# Patient Record
Sex: MALE | Race: WHITE | NOT HISPANIC OR LATINO | Employment: OTHER | URBAN - METROPOLITAN AREA
[De-identification: names, ages, dates, MRNs, and addresses within clinical notes are randomized per-mention and may not be internally consistent; named-entity substitution may affect disease eponyms.]

---

## 2023-04-03 PROBLEM — N42.89: Status: ACTIVE | Noted: 2023-04-03

## 2023-04-03 PROBLEM — K57.90 DIVERTICULOSIS: Status: ACTIVE | Noted: 2023-04-03

## 2023-04-03 PROBLEM — R79.89 ELEVATED LFTS: Status: ACTIVE | Noted: 2023-04-03

## 2023-04-03 PROBLEM — M10.9 GOUT: Status: ACTIVE | Noted: 2023-04-03

## 2023-04-03 PROBLEM — E78.5 HLD (HYPERLIPIDEMIA): Status: ACTIVE | Noted: 2023-04-03

## 2023-04-03 PROBLEM — I10 HYPERTENSION: Status: ACTIVE | Noted: 2023-04-03

## 2023-04-03 PROBLEM — R31.9 HEMATURIA: Status: ACTIVE | Noted: 2023-04-03

## 2023-04-03 PROBLEM — E55.9 VITAMIN D DEFICIENCY: Status: ACTIVE | Noted: 2023-04-03

## 2023-04-03 PROBLEM — R79.89 ELEVATED TSH: Status: ACTIVE | Noted: 2023-04-03

## 2023-04-03 PROBLEM — E66.9 OBESITY: Status: ACTIVE | Noted: 2023-04-03

## 2023-04-03 PROBLEM — R53.83 FATIGUE: Status: ACTIVE | Noted: 2023-04-03

## 2023-04-03 PROBLEM — E11.9 DIABETES MELLITUS, TYPE 2 (MULTI): Status: ACTIVE | Noted: 2023-04-03

## 2023-04-03 RX ORDER — METFORMIN HYDROCHLORIDE 500 MG/1
1 TABLET ORAL
COMMUNITY
Start: 2017-06-09 | End: 2023-06-05

## 2023-04-03 RX ORDER — LANCETS 33 GAUGE
EACH MISCELLANEOUS
COMMUNITY

## 2023-04-03 RX ORDER — LISINOPRIL 20 MG/1
1 TABLET ORAL DAILY
COMMUNITY
Start: 2014-09-03 | End: 2023-07-06

## 2023-04-03 RX ORDER — ALLOPURINOL 300 MG/1
1 TABLET ORAL DAILY
COMMUNITY
Start: 2020-08-26 | End: 2023-05-09

## 2023-04-03 RX ORDER — BISOPROLOL FUMARATE AND HYDROCHLOROTHIAZIDE 10; 6.25 MG/1; MG/1
1 TABLET ORAL DAILY
COMMUNITY
Start: 2017-03-12 | End: 2023-06-05

## 2023-04-03 RX ORDER — BLOOD SUGAR DIAGNOSTIC
STRIP MISCELLANEOUS
COMMUNITY
Start: 2020-09-11 | End: 2023-07-31

## 2023-04-04 ENCOUNTER — OFFICE VISIT (OUTPATIENT)
Dept: PRIMARY CARE | Facility: CLINIC | Age: 73
End: 2023-04-04
Payer: MEDICARE

## 2023-04-04 VITALS
HEART RATE: 74 BPM | HEIGHT: 72 IN | BODY MASS INDEX: 31.07 KG/M2 | SYSTOLIC BLOOD PRESSURE: 138 MMHG | WEIGHT: 229.4 LBS | DIASTOLIC BLOOD PRESSURE: 75 MMHG | TEMPERATURE: 98.3 F

## 2023-04-04 DIAGNOSIS — E11.9 TYPE 2 DIABETES MELLITUS WITHOUT COMPLICATION, WITHOUT LONG-TERM CURRENT USE OF INSULIN (MULTI): ICD-10-CM

## 2023-04-04 DIAGNOSIS — Z12.5 PROSTATE CANCER SCREENING: ICD-10-CM

## 2023-04-04 DIAGNOSIS — I10 HYPERTENSION, UNSPECIFIED TYPE: ICD-10-CM

## 2023-04-04 DIAGNOSIS — E55.9 VITAMIN D DEFICIENCY: ICD-10-CM

## 2023-04-04 DIAGNOSIS — R79.89 ELEVATED TSH: ICD-10-CM

## 2023-04-04 DIAGNOSIS — M10.9 GOUT, UNSPECIFIED CAUSE, UNSPECIFIED CHRONICITY, UNSPECIFIED SITE: ICD-10-CM

## 2023-04-04 DIAGNOSIS — Z00.00 MEDICARE ANNUAL WELLNESS VISIT, SUBSEQUENT: Primary | ICD-10-CM

## 2023-04-04 DIAGNOSIS — E78.5 HYPERLIPIDEMIA, UNSPECIFIED HYPERLIPIDEMIA TYPE: ICD-10-CM

## 2023-04-04 DIAGNOSIS — R53.83 OTHER FATIGUE: ICD-10-CM

## 2023-04-04 PROBLEM — M19.90 ARTHRITIS: Status: RESOLVED | Noted: 2023-04-04 | Resolved: 2023-04-04

## 2023-04-04 LAB
NON-UH HIE A/G RATIO: 1.8
NON-UH HIE ALB: 4.6 G/DL (ref 3.4–5)
NON-UH HIE ALK PHOS: 45 UNIT/L (ref 46–116)
NON-UH HIE BILIRUBIN, TOTAL: 1 MG/DL (ref 0.2–1)
NON-UH HIE BUN/CREAT RATIO: 16.4
NON-UH HIE BUN: 18 MG/DL (ref 9–23)
NON-UH HIE CALCIUM: 10 MG/DL (ref 8.7–10.4)
NON-UH HIE CALCULATED LDL CHOLESTEROL: 111 MG/DL (ref 60–130)
NON-UH HIE CALCULATED OSMOLALITY: 283 MOSM/KG (ref 275–295)
NON-UH HIE CHLORIDE: 104 MMOL/L (ref 98–107)
NON-UH HIE CHOLESTEROL: 202 MG/DL (ref 100–200)
NON-UH HIE CO2, VENOUS: 28 MMOL/L (ref 20–31)
NON-UH HIE CREATININE: 1.1 MG/DL (ref 0.6–1.1)
NON-UH HIE FREE T4: 0.93 NG/DL (ref 0.89–1.76)
NON-UH HIE GFR AA: >60
NON-UH HIE GLOBULIN: 2.5 G/DL
NON-UH HIE GLOMERULAR FILTRATION RATE: >60 ML/MIN/1.73M?
NON-UH HIE GLUCOSE: 121 MG/DL (ref 74–106)
NON-UH HIE GOT: 40 UNIT/L (ref 15–37)
NON-UH HIE GPT: 60 UNIT/L (ref 10–49)
NON-UH HIE HCT: 45.6 % (ref 41–52)
NON-UH HIE HDL CHOLESTEROL: 31 MG/DL (ref 40–60)
NON-UH HIE HGB A1C: 5.5 %
NON-UH HIE HGB: 15.8 G/DL (ref 13.5–17.5)
NON-UH HIE INSTR WBC ND: 6.2
NON-UH HIE K: 4.4 MMOL/L (ref 3.5–5.1)
NON-UH HIE MCH: 34.9 PG (ref 27–34)
NON-UH HIE MCHC: 34.5 G/DL (ref 32–37)
NON-UH HIE MCV: 101 FL (ref 80–100)
NON-UH HIE MPV: 8.5 FL (ref 7.4–10.4)
NON-UH HIE NA: 140 MMOL/L (ref 135–145)
NON-UH HIE PLATELET: 174 X10 (ref 150–450)
NON-UH HIE RBC: 4.52 X10 (ref 4.7–6.1)
NON-UH HIE RDW: 12.7 % (ref 11.5–14.5)
NON-UH HIE TOTAL CHOL/HDL CHOL RATIO: 6.5
NON-UH HIE TOTAL PROTEIN: 7.1 G/DL (ref 5.7–8.2)
NON-UH HIE TRIGLYCERIDES: 302 MG/DL (ref 30–150)
NON-UH HIE TSH: 6.25 UIU/ML (ref 0.55–4.78)
NON-UH HIE URIC ACID: 5.8 (ref 3.7–9.2)
NON-UH HIE VIT D 25: 23 NG/ML
NON-UH HIE WBC: 6.2 X10 (ref 4.5–11)

## 2023-04-04 PROCEDURE — 4010F ACE/ARB THERAPY RXD/TAKEN: CPT | Performed by: FAMILY MEDICINE

## 2023-04-04 PROCEDURE — 1036F TOBACCO NON-USER: CPT | Performed by: FAMILY MEDICINE

## 2023-04-04 PROCEDURE — 1170F FXNL STATUS ASSESSED: CPT | Performed by: FAMILY MEDICINE

## 2023-04-04 PROCEDURE — 1159F MED LIST DOCD IN RCRD: CPT | Performed by: FAMILY MEDICINE

## 2023-04-04 PROCEDURE — G0439 PPPS, SUBSEQ VISIT: HCPCS | Performed by: FAMILY MEDICINE

## 2023-04-04 PROCEDURE — 3078F DIAST BP <80 MM HG: CPT | Performed by: FAMILY MEDICINE

## 2023-04-04 PROCEDURE — 3075F SYST BP GE 130 - 139MM HG: CPT | Performed by: FAMILY MEDICINE

## 2023-04-04 PROCEDURE — 1160F RVW MEDS BY RX/DR IN RCRD: CPT | Performed by: FAMILY MEDICINE

## 2023-04-04 ASSESSMENT — ENCOUNTER SYMPTOMS
NAUSEA: 0
CONSTIPATION: 0
VOMITING: 0
UNEXPECTED WEIGHT CHANGE: 0
CHEST TIGHTNESS: 0
DIARRHEA: 0
SHORTNESS OF BREATH: 0
PALPITATIONS: 0
ACTIVITY CHANGE: 0
ABDOMINAL PAIN: 0
BLOOD IN STOOL: 0
APPETITE CHANGE: 0

## 2023-04-04 ASSESSMENT — ACTIVITIES OF DAILY LIVING (ADL)
GROCERY_SHOPPING: INDEPENDENT
BATHING: INDEPENDENT
TAKING_MEDICATION: INDEPENDENT
DRESSING: INDEPENDENT
DOING_HOUSEWORK: INDEPENDENT
MANAGING_FINANCES: INDEPENDENT

## 2023-04-04 ASSESSMENT — PATIENT HEALTH QUESTIONNAIRE - PHQ9
2. FEELING DOWN, DEPRESSED OR HOPELESS: NOT AT ALL
SUM OF ALL RESPONSES TO PHQ9 QUESTIONS 1 AND 2: 0
1. LITTLE INTEREST OR PLEASURE IN DOING THINGS: NOT AT ALL

## 2023-04-04 NOTE — PROGRESS NOTES
Subjective   Reason for Visit: Alberto Tinsley is an 72 y.o. male here for a Medicare Wellness visit.     Past Medical, Surgical, and Family History reviewed and updated in chart.    Reviewed all medications by prescribing practitioner or clinical pharmacist (such as prescriptions, OTCs, herbal therapies and supplements) and documented in the medical record.    HPI  72 years old male presents for f/u and medicare wellness visit     hx HTN-on lisinopril 20mg and bisoprolol/HCTZ 10/6.25;  has not been checking BP at home recently    hx HLD/DM2/obesity BMI 31   Tries to watch diet;  walks 3/4 mile on treadmill every day  checks his sugars- avgs- 130s.    hx of tob=quit around 2000  no abd pain  prior CT showed aortic calcification  pt defers checking screening US for AAA     hx of elevated tsh but normal free T4 in past.   4/2018 thyroid US showed:  Slight enlargement of both lobes.  Inhomogeneous thyroid parenchyma is a nonspecific ultrasonographic  finding but consistent with chronic medical thyroid disease.  no neck changes.    hx gout- no recent flare   takes allopurinol    hx of kidney stones in past;   had renal US in may 2018- showed an incidental mass in prostate- referred to urology- saw dr grubbs- was told nothing to worry about per pt;   saw him 5/2020 virtually- he was supposed to check a 24 hr urine and f/u in 6 mo but never did  5/2022 PSA wnl  Had gross hematuria/kidney stone 2022- saw urologist     hx of elevated LFTs in past; never had liver US as ordered in past no RUQ abd pain;l    had +cologuard 2019- had f/u colonoscopy 6/19 and 12/19+polyps; f/u 2 yrs; no bowel changes. no blood in stool.    GI volweiler- has scheduled for  4/26/23    shingrix-defers  flu shot-had  pneumovax-2015  prevnar   covid shots- had    denies any chest pains, palpitations, shortness of breath, abdominal pains, reflux, nausea, vomiting, diarrhea, constipation, blood in stool, melena.       wears glasses; due to see  optho. No vision changes  due sees dentist for regular cleanings;    no mole changes.      no sores on feet  no paresthesias    lives in [facility] with his girlfriend most of the time  comes back to stay with daughter here    flu shot-had  prevnar 13 2016  Pneumovax 2015  recommended shingrix - defers at this time.   had covid shots        /75   Pulse 74   Temp 36.8 °C (98.3 °F)   Ht 1.829 m (6')   Wt 104 kg (229 lb 6.4 oz)   BMI 31.11 kg/m²     Lab Results   Component Value Date    WBC 8.4 05/10/2022    HGB 15.9 05/10/2022    HCT 47.1 05/10/2022     (H) 05/10/2022     05/10/2022       Chemistry    Lab Results   Component Value Date/Time     05/10/2022 0949    K 4.6 05/10/2022 0949     05/10/2022 0949    CO2 27 05/10/2022 0949    BUN 22 05/10/2022 0949    CREATININE 1.14 05/10/2022 0949    Lab Results   Component Value Date/Time    CALCIUM 9.8 05/10/2022 0949    ALKPHOS 44 05/10/2022 0949    AST 26 05/10/2022 0949    ALT 36 05/10/2022 0949    BILITOT 0.9 05/10/2022 0949           Lab Results   Component Value Date    CHOL 204 (H) 05/10/2022    CHOL 180 04/27/2021    CHOL 199 04/04/2019     Lab Results   Component Value Date    HDL 30.8 (A) 05/10/2022    HDL 28.1 (A) 04/27/2021    HDL 33.0 (A) 04/04/2019     No results found for: LDLCALC  Lab Results   Component Value Date    TRIG 308 (H) 05/10/2022    TRIG 350 (H) 04/27/2021    TRIG 191 (H) 04/04/2019     No components found for: CHOLHDL    Patient Self Assessment of Health Status  Patient Self Assessment: Good    Nutrition and Exercise  Current Diet: Well Balanced Diet  Adequate Fluid Intake: Yes  Caffeine: Yes  Exercise Frequency: Infrequently    Functional Ability/Level of Safety  Cognitive Impairment Observed: No cognitive impairment observed    Home Safety Risk Factors: None    Patient Care Team:  Wendy C Malinda, DO as PCP - General  Wendy Petty, DO as PCP - [place] Medicare [employer]vantage PCP     Review of Systems    Constitutional:  Negative for activity change, appetite change and unexpected weight change.   Respiratory:  Negative for chest tightness and shortness of breath.    Cardiovascular:  Negative for chest pain, palpitations and leg swelling.   Gastrointestinal:  Negative for abdominal pain, blood in stool, constipation, diarrhea, nausea and vomiting.       Medicare Wellness Billing Compliance Satisfied    Objective     Vitals:  /75   Pulse 74   Temp 36.8 °C (98.3 °F)   Ht 1.829 m (6')   Wt 104 kg (229 lb 6.4 oz)   BMI 31.11 kg/m²       Physical Exam  Vitals and nursing note reviewed.   Constitutional:       Appearance: Normal appearance. He is normal weight.   HENT:      Head: Normocephalic and atraumatic.      Right Ear: Tympanic membrane, ear canal and external ear normal.      Left Ear: Tympanic membrane, ear canal and external ear normal.      Nose: Nose normal.      Mouth/Throat:      Mouth: Mucous membranes are moist.      Pharynx: Oropharynx is clear.   Eyes:      Extraocular Movements: Extraocular movements intact.      Conjunctiva/sclera: Conjunctivae normal.      Pupils: Pupils are equal, round, and reactive to light.   Cardiovascular:      Rate and Rhythm: Normal rate and regular rhythm.   Pulmonary:      Effort: Pulmonary effort is normal.      Breath sounds: Normal breath sounds.   Abdominal:      General: Abdomen is flat. Bowel sounds are normal.      Palpations: Abdomen is soft.   Musculoskeletal:         General: Normal range of motion.      Cervical back: Neck supple.   Skin:     General: Skin is warm and dry.   Neurological:      General: No focal deficit present.      Mental Status: He is alert and oriented to person, place, and time.   Psychiatric:         Mood and Affect: Mood normal.         Behavior: Behavior normal.         Thought Content: Thought content normal.         Judgment: Judgment normal.         Assessment/Plan   Problem List Items Addressed This Visit           Circulatory    Hypertension    Relevant Orders    Comprehensive Metabolic Panel       Endocrine/Metabolic    Diabetes mellitus, type 2 (CMS/HCC)    Relevant Orders    Comprehensive Metabolic Panel    Hemoglobin A1C    Vitamin D deficiency    Relevant Orders    Vitamin D, Total       Other    Elevated TSH    Relevant Orders    TSH with reflex to Free T4 if abnormal    Fatigue    Relevant Orders    CBC    TSH with reflex to Free T4 if abnormal    Gout - Primary    Relevant Orders    Uric acid    HLD (hyperlipidemia)    Relevant Orders    Lipid Panel    Comprehensive Metabolic Panel     Other Visit Diagnoses       Prostate cancer screening        Relevant Orders    Prostate Specific Antigen, Screen        check fasting labs  5/2022 urine MA+; on lisinopril; will recheck at next visit  cont to monitor BP  12/2019 colonosocpy +polyps f/u 2 yrs- f/u with GI- was due 12/2021; has upcoming appt  healthy lifestyle-diet and exercise, wt loss.  annual optho   Pt defers checking screening AAA US   Check PSA in may

## 2023-04-05 ENCOUNTER — TELEPHONE (OUTPATIENT)
Dept: PRIMARY CARE | Facility: CLINIC | Age: 73
End: 2023-04-05
Payer: MEDICARE

## 2023-04-05 NOTE — TELEPHONE ENCOUNTER
----- Message from Wendy Petty DO sent at 4/5/2023  1:50 PM EDT -----  Noted; thanks  ----- Message -----  From: Raquel Casarez CMA  Sent: 4/5/2023  12:50 PM EDT  To: Wendy Petty DO    Informed patient. He will call back to schedule the follow up appt.   ----- Message -----  From: Wendy Petty DO  Sent: 4/5/2023   9:40 AM EDT  To: Raquel Casarez CMA    Please let pt know that his uric acid level, blood counts, electrolytes, and kidney function are all normal.   His liver enzymes are again slightly elevated.  His vitamin D level is low at 23 (should be above 30).  I recommend he  take at least 2000 units of an OTC vitamin D supplement daily and we will cont to monitor.  His screening thyroid test was again slightly out of range but the actual level of circulating thyroid hormone was normal.    His sugar was slightly elevated at 121 but his HbA1c, the 3 month average of his  sugars was good at 5.5.   His triglycerides were quite elevated at 302(should be less than 150).  I recommend a healthy diet and exercise and have him follow up in a few mo to see how he is doing and recheck labs.

## 2023-05-09 DIAGNOSIS — M10.9 GOUT, UNSPECIFIED: ICD-10-CM

## 2023-05-09 RX ORDER — ALLOPURINOL 300 MG/1
TABLET ORAL
Qty: 90 TABLET | Refills: 1 | Status: SHIPPED | OUTPATIENT
Start: 2023-05-09 | End: 2023-11-06

## 2023-06-05 DIAGNOSIS — E11.9 TYPE 2 DIABETES MELLITUS WITHOUT COMPLICATION, WITHOUT LONG-TERM CURRENT USE OF INSULIN (MULTI): ICD-10-CM

## 2023-06-05 DIAGNOSIS — I10 ESSENTIAL (PRIMARY) HYPERTENSION: ICD-10-CM

## 2023-06-05 RX ORDER — METFORMIN HYDROCHLORIDE 500 MG/1
TABLET ORAL
Qty: 180 TABLET | Refills: 1 | Status: SHIPPED | OUTPATIENT
Start: 2023-06-05 | End: 2023-09-11

## 2023-06-05 RX ORDER — BISOPROLOL FUMARATE AND HYDROCHLOROTHIAZIDE 10; 6.25 MG/1; MG/1
TABLET ORAL
Qty: 90 TABLET | Refills: 1 | Status: SHIPPED | OUTPATIENT
Start: 2023-06-05 | End: 2023-09-11

## 2023-07-01 LAB — HEMOGLOBIN A1C/HEMOGLOBIN TOTAL IN BLOOD EXTERNAL: 5.5 %

## 2023-07-06 DIAGNOSIS — I10 ESSENTIAL (PRIMARY) HYPERTENSION: ICD-10-CM

## 2023-07-06 RX ORDER — LISINOPRIL 20 MG/1
TABLET ORAL
Qty: 90 TABLET | Refills: 1 | Status: SHIPPED | OUTPATIENT
Start: 2023-07-06 | End: 2024-01-02

## 2023-07-29 DIAGNOSIS — E11.9 TYPE 2 DIABETES MELLITUS WITHOUT COMPLICATIONS (MULTI): ICD-10-CM

## 2023-07-31 RX ORDER — BLOOD SUGAR DIAGNOSTIC
STRIP MISCELLANEOUS
Qty: 100 STRIP | Refills: 1 | Status: SHIPPED | OUTPATIENT
Start: 2023-07-31 | End: 2024-02-12

## 2023-09-09 DIAGNOSIS — I10 ESSENTIAL (PRIMARY) HYPERTENSION: ICD-10-CM

## 2023-09-09 DIAGNOSIS — E11.9 TYPE 2 DIABETES MELLITUS WITHOUT COMPLICATION, WITHOUT LONG-TERM CURRENT USE OF INSULIN (MULTI): ICD-10-CM

## 2023-09-11 RX ORDER — METFORMIN HYDROCHLORIDE 500 MG/1
500 TABLET ORAL
Qty: 180 TABLET | Refills: 1 | Status: SHIPPED | OUTPATIENT
Start: 2023-09-11 | End: 2024-02-29

## 2023-09-11 RX ORDER — BISOPROLOL FUMARATE AND HYDROCHLOROTHIAZIDE 10; 6.25 MG/1; MG/1
TABLET ORAL
Qty: 90 TABLET | Refills: 1 | Status: SHIPPED | OUTPATIENT
Start: 2023-09-11 | End: 2024-02-29

## 2023-11-05 DIAGNOSIS — M10.9 GOUT, UNSPECIFIED: ICD-10-CM

## 2023-11-06 RX ORDER — ALLOPURINOL 300 MG/1
TABLET ORAL
Qty: 90 TABLET | Refills: 1 | Status: SHIPPED | OUTPATIENT
Start: 2023-11-06 | End: 2024-04-30

## 2023-12-31 DIAGNOSIS — I10 ESSENTIAL (PRIMARY) HYPERTENSION: ICD-10-CM

## 2024-01-02 RX ORDER — LISINOPRIL 20 MG/1
TABLET ORAL
Qty: 90 TABLET | Refills: 1 | Status: SHIPPED | OUTPATIENT
Start: 2024-01-02

## 2024-02-12 DIAGNOSIS — E11.9 TYPE 2 DIABETES MELLITUS WITHOUT COMPLICATIONS (MULTI): ICD-10-CM

## 2024-02-12 RX ORDER — BLOOD SUGAR DIAGNOSTIC
STRIP MISCELLANEOUS
Qty: 100 STRIP | Refills: 1 | Status: SHIPPED | OUTPATIENT
Start: 2024-02-12

## 2024-02-19 ENCOUNTER — TELEPHONE (OUTPATIENT)
Dept: PRIMARY CARE | Facility: CLINIC | Age: 74
End: 2024-02-19
Payer: MEDICARE

## 2024-02-29 DIAGNOSIS — E11.9 TYPE 2 DIABETES MELLITUS WITHOUT COMPLICATION, WITHOUT LONG-TERM CURRENT USE OF INSULIN (MULTI): ICD-10-CM

## 2024-02-29 DIAGNOSIS — I10 ESSENTIAL (PRIMARY) HYPERTENSION: ICD-10-CM

## 2024-02-29 RX ORDER — METFORMIN HYDROCHLORIDE 500 MG/1
500 TABLET ORAL
Qty: 180 TABLET | Refills: 1 | Status: SHIPPED | OUTPATIENT
Start: 2024-02-29

## 2024-02-29 RX ORDER — BISOPROLOL FUMARATE AND HYDROCHLOROTHIAZIDE 10; 6.25 MG/1; MG/1
TABLET ORAL
Qty: 90 TABLET | Refills: 1 | Status: SHIPPED | OUTPATIENT
Start: 2024-02-29

## 2024-04-22 NOTE — PROGRESS NOTES
Subjective   Reason for Visit: Alberto Tinsley is an 73 y.o. male here for a Medicare Wellness visit.     Past Medical, Surgical, and Family History reviewed and updated in chart.    Reviewed all medications by prescribing practitioner or clinical pharmacist (such as prescriptions, OTCs, herbal therapies and supplements) and documented in the medical record.    HPI  73 years old male presents for f/u and medicare wellness visit     Last labs 4/2023 HbA1c 5.5   Vit D was low    hx HTN-on lisinopril 20mg and bisoprolol/HCTZ 10/6.25;  has not been checking BP at home recently but has a cuff    hx HLD/DM2/obesity BMI 30   Tries to watch diet;    Got a total gym  checks his sugars- a little higher recently with vacation    Hx  of tob=quit around 2000  no abd pain  prior CT showed aortic calcification  pt defers checking screening US for AAA     hx of elevated tsh but normal free T4 in past.   4/2018 thyroid US showed:  Slight enlargement of both lobes.  Inhomogeneous thyroid parenchyma is a nonspecific ultrasonographic  finding but consistent with chronic medical thyroid disease.  no neck changes.    hx gout- no recent flare   takes allopurinol    hx of kidney stones in past;   had renal US in may 2018- showed an incidental mass in prostate- referred to urology- saw dr grubbs- was told nothing to worry about per pt; fu this yr  saw him 5/2020 virtually- he was supposed to check a 24 hr urine and f/u in 6 mo but never did  5/2022 PSA wnl?   Had gross hematuria/kidney stone 2022- saw urologist;  never fu; no recent issues  No urinary changes.  Hx of UTI last summer;     hx of elevated LFTs in past; never had liver US as ordered in past no RUQ abd pain;    had +cologuard 2019- had f/u colonoscopy 6/19 and 12/19+polyps; f/u 2 yrs; no bowel changes. no blood in stool.    GI volweiler- had done 4/26/23  fu 3 yrs;      shingrix-defers  flu shot-had  pneumovax-2015  prevnar   covid shots- had  RSV-not yet    denies any  chest pains, palpitations, shortness of breath, abdominal pains, reflux, nausea, vomiting, diarrhea, constipation, blood in stool, melena.       wears glasses;  sees optho.   due sees dentist for regular cleanings;  had a tooth pulled bc it cracked    no mole changes.      no sores on feet  no paresthesias    lives in [facility] with his girlfriend most of the time  comes back to stay with daughter here          /81   Pulse 74   Temp 36.8 °C (98.3 °F)   Ht 1.829 m (6')   Wt 103 kg (227 lb 3.2 oz)   BMI 30.81 kg/m²     Patient Self Assessment of Health Status  Patient Self Assessment: Good    Nutrition and Exercise  Current Diet: Well Balanced Diet  Adequate Fluid Intake: Yes  Caffeine: Yes  Exercise Frequency: Infrequently         Home Safety Risk Factors: None    Patient Care Team:  Wendy Petty DO as PCP - General  Wendy Petty DO as PCP - [employer]vantage PCP     Review of Systems  ROS as stated in HPI    Medicare Wellness Visit Billing Compliance Not Met    *This is a visual tool to show completion of required items on the day of the visit. Green checks will only appear on the date of visit.    Objective     Vitals:  /81   Pulse 74   Temp 36.8 °C (98.3 °F)   Ht 1.829 m (6')   Wt 103 kg (227 lb 3.2 oz)   BMI 30.81 kg/m²       Physical Exam  Constitutional: A&O; NAD  HEENT: conjunctiva normal. EOMI; PERRLA; lids normal; TM's clear;   Neck: supple; No lymphadenopathy   Heart: RRR     Lungs: CTA bilateral   Abd: Soft, Nontender, Nondistended  Ext:  No edema;    Neuro: No gross neuro deficits     Psych: Judgment, orientation, mood, affect, insight wnl   Assessment/Plan   Problem List Items Addressed This Visit       Diabetes mellitus, type 2 (Multi)     Other Visit Diagnoses       Medicare annual wellness visit, subsequent    -  Primary        flu shot-had  prevnar 13 2016  Pneumovax 2015  recommended shingrix - defers at this time.   RSV- defers  had covid shots  check  fasting labs/PSA  5/2022 urine MA+; on lisinopril; recheck -neg  Check UA-neg; fu with urologist  cont to monitor BP  Pt prefers to monitor BP at home- Recommend increasing lisinopril from 20 to 30mg if consistently elevated  4/2023  colonosocpy +polyps f/u 3 yrs per pt GI vollweiler  healthy lifestyle-diet and exercise, wt loss.  annual optho   Pt defers checking screening AAA US

## 2024-04-25 ENCOUNTER — OFFICE VISIT (OUTPATIENT)
Dept: PRIMARY CARE | Facility: CLINIC | Age: 74
End: 2024-04-25
Payer: MEDICARE

## 2024-04-25 VITALS
WEIGHT: 227.2 LBS | BODY MASS INDEX: 30.77 KG/M2 | TEMPERATURE: 98.3 F | DIASTOLIC BLOOD PRESSURE: 78 MMHG | SYSTOLIC BLOOD PRESSURE: 136 MMHG | HEIGHT: 72 IN | HEART RATE: 74 BPM

## 2024-04-25 DIAGNOSIS — R53.83 OTHER FATIGUE: ICD-10-CM

## 2024-04-25 DIAGNOSIS — E55.9 VITAMIN D DEFICIENCY: ICD-10-CM

## 2024-04-25 DIAGNOSIS — E11.9 TYPE 2 DIABETES MELLITUS WITHOUT COMPLICATION, WITHOUT LONG-TERM CURRENT USE OF INSULIN (MULTI): ICD-10-CM

## 2024-04-25 DIAGNOSIS — R79.89 ELEVATED TSH: ICD-10-CM

## 2024-04-25 DIAGNOSIS — R31.9 HEMATURIA, UNSPECIFIED TYPE: ICD-10-CM

## 2024-04-25 DIAGNOSIS — M10.9 GOUT, UNSPECIFIED CAUSE, UNSPECIFIED CHRONICITY, UNSPECIFIED SITE: ICD-10-CM

## 2024-04-25 DIAGNOSIS — Z00.00 HEALTHCARE MAINTENANCE: ICD-10-CM

## 2024-04-25 DIAGNOSIS — I10 HYPERTENSION, UNSPECIFIED TYPE: ICD-10-CM

## 2024-04-25 DIAGNOSIS — Z00.00 MEDICARE ANNUAL WELLNESS VISIT, SUBSEQUENT: Primary | ICD-10-CM

## 2024-04-25 DIAGNOSIS — E78.5 HYPERLIPIDEMIA, UNSPECIFIED HYPERLIPIDEMIA TYPE: ICD-10-CM

## 2024-04-25 DIAGNOSIS — Z12.5 PROSTATE CANCER SCREENING: ICD-10-CM

## 2024-04-25 LAB
NON-UH HIE A/G RATIO: 1.5
NON-UH HIE ALB: 4.6 G/DL (ref 3.4–5)
NON-UH HIE ALK PHOS: 49 UNIT/L (ref 45–117)
NON-UH HIE BILIRUBIN, TOTAL: 1.1 MG/DL (ref 0.3–1.2)
NON-UH HIE BUN/CREAT RATIO: 15.5
NON-UH HIE BUN: 17 MG/DL (ref 9–23)
NON-UH HIE CALCIUM: 10.2 MG/DL (ref 8.7–10.4)
NON-UH HIE CALCULATED LDL CHOLESTEROL: 124 MG/DL (ref 60–130)
NON-UH HIE CALCULATED OSMOLALITY: 283 MOSM/KG (ref 275–295)
NON-UH HIE CHLORIDE: 103 MMOL/L (ref 98–107)
NON-UH HIE CHOLESTEROL: 221 MG/DL (ref 100–200)
NON-UH HIE CO2, VENOUS: 28 MMOL/L (ref 20–31)
NON-UH HIE CREATININE: 1.1 MG/DL (ref 0.6–1.1)
NON-UH HIE FREE T4: 1.05 NG/DL (ref 0.89–1.76)
NON-UH HIE GFR AA: >60
NON-UH HIE GLOBULIN: 3 G/DL
NON-UH HIE GLOMERULAR FILTRATION RATE: >60 ML/MIN/1.73M?
NON-UH HIE GLUCOSE: 127 MG/DL (ref 74–106)
NON-UH HIE GOT: 70 UNIT/L (ref 15–37)
NON-UH HIE GPT: 106 UNIT/L (ref 10–49)
NON-UH HIE HCT: 45.6 % (ref 41–52)
NON-UH HIE HDL CHOLESTEROL: 36 MG/DL (ref 40–60)
NON-UH HIE HGB A1C: 5.6 %
NON-UH HIE HGB: 16 G/DL (ref 13.5–17.5)
NON-UH HIE INSTR WBC ND: 6.3
NON-UH HIE K: 4.4 MMOL/L (ref 3.5–5.1)
NON-UH HIE MCH: 35.6 PG (ref 27–34)
NON-UH HIE MCHC: 35 G/DL (ref 32–37)
NON-UH HIE MCV: 101.7 FL (ref 80–100)
NON-UH HIE MPV: 8.2 FL (ref 7.4–10.4)
NON-UH HIE NA: 140 MMOL/L (ref 135–145)
NON-UH HIE PLATELET: 193 X10 (ref 150–450)
NON-UH HIE PROSTATIC SPECIFIC AG SCREEN: 0.4 NG/ML (ref 0–4)
NON-UH HIE RBC: 4.49 X10 (ref 4.7–6.1)
NON-UH HIE RDW: 12.9 % (ref 11.5–14.5)
NON-UH HIE TOTAL CHOL/HDL CHOL RATIO: 6.1
NON-UH HIE TOTAL PROTEIN: 7.6 G/DL (ref 5.7–8.2)
NON-UH HIE TRIGLYCERIDES: 303 MG/DL (ref 30–150)
NON-UH HIE TSH: 7.11 UIU/ML (ref 0.55–4.78)
NON-UH HIE URIC ACID: 5.6 MG/DL (ref 3.7–9.2)
NON-UH HIE VIT D 25: 28 NG/ML
NON-UH HIE WBC: 6.3 X10 (ref 4.5–11)
POC ALBUMIN /CREATININE RATIO MANUALLY ENTERED: <30 UG/MG CREAT
POC APPEARANCE, URINE: CLEAR
POC BILIRUBIN, URINE: NEGATIVE
POC BLOOD, URINE: NEGATIVE
POC COLOR, URINE: ABNORMAL
POC GLUCOSE, URINE: NEGATIVE MG/DL
POC KETONES, URINE: NEGATIVE MG/DL
POC LEUKOCYTES, URINE: NEGATIVE
POC NITRITE,URINE: NEGATIVE
POC PH, URINE: 6 PH
POC PROTEIN, URINE: ABNORMAL MG/DL
POC SPECIFIC GRAVITY, URINE: 1.02
POC URINE ALBUMIN: 30 MG/L
POC URINE CREATININE: 100 MG/DL
POC UROBILINOGEN, URINE: 0.2 EU/DL

## 2024-04-25 PROCEDURE — 1170F FXNL STATUS ASSESSED: CPT | Performed by: FAMILY MEDICINE

## 2024-04-25 PROCEDURE — 82044 UR ALBUMIN SEMIQUANTITATIVE: CPT | Performed by: FAMILY MEDICINE

## 2024-04-25 PROCEDURE — 4010F ACE/ARB THERAPY RXD/TAKEN: CPT | Performed by: FAMILY MEDICINE

## 2024-04-25 PROCEDURE — 3075F SYST BP GE 130 - 139MM HG: CPT | Performed by: FAMILY MEDICINE

## 2024-04-25 PROCEDURE — G0439 PPPS, SUBSEQ VISIT: HCPCS | Performed by: FAMILY MEDICINE

## 2024-04-25 PROCEDURE — 1124F ACP DISCUSS-NO DSCNMKR DOCD: CPT | Performed by: FAMILY MEDICINE

## 2024-04-25 PROCEDURE — 81003 URINALYSIS AUTO W/O SCOPE: CPT | Performed by: FAMILY MEDICINE

## 2024-04-25 PROCEDURE — 1036F TOBACCO NON-USER: CPT | Performed by: FAMILY MEDICINE

## 2024-04-25 PROCEDURE — 3078F DIAST BP <80 MM HG: CPT | Performed by: FAMILY MEDICINE

## 2024-04-25 PROCEDURE — 1159F MED LIST DOCD IN RCRD: CPT | Performed by: FAMILY MEDICINE

## 2024-04-25 PROCEDURE — 1160F RVW MEDS BY RX/DR IN RCRD: CPT | Performed by: FAMILY MEDICINE

## 2024-04-25 ASSESSMENT — PATIENT HEALTH QUESTIONNAIRE - PHQ9
10. IF YOU CHECKED OFF ANY PROBLEMS, HOW DIFFICULT HAVE THESE PROBLEMS MADE IT FOR YOU TO DO YOUR WORK, TAKE CARE OF THINGS AT HOME, OR GET ALONG WITH OTHER PEOPLE: NOT DIFFICULT AT ALL
2. FEELING DOWN, DEPRESSED OR HOPELESS: NOT AT ALL
SUM OF ALL RESPONSES TO PHQ9 QUESTIONS 1 AND 2: 0
1. LITTLE INTEREST OR PLEASURE IN DOING THINGS: NOT AT ALL

## 2024-04-25 ASSESSMENT — ACTIVITIES OF DAILY LIVING (ADL)
DRESSING: INDEPENDENT
DOING_HOUSEWORK: INDEPENDENT
BATHING: INDEPENDENT
GROCERY_SHOPPING: INDEPENDENT
MANAGING_FINANCES: INDEPENDENT
TAKING_MEDICATION: INDEPENDENT

## 2024-04-26 ENCOUNTER — TELEPHONE (OUTPATIENT)
Dept: PRIMARY CARE | Facility: CLINIC | Age: 74
End: 2024-04-26
Payer: MEDICARE

## 2024-04-26 LAB — HEMOGLOBIN A1C/HEMOGLOBIN TOTAL IN BLOOD EXTERNAL: 5.6 %

## 2024-04-26 ASSESSMENT — PATIENT HEALTH QUESTIONNAIRE - PHQ9
1. LITTLE INTEREST OR PLEASURE IN DOING THINGS: NOT AT ALL
SUM OF ALL RESPONSES TO PHQ9 QUESTIONS 1 AND 2: 0
2. FEELING DOWN, DEPRESSED OR HOPELESS: NOT AT ALL

## 2024-04-26 ASSESSMENT — ACTIVITIES OF DAILY LIVING (ADL)
GROCERY_SHOPPING: INDEPENDENT
TAKING_MEDICATION: INDEPENDENT
DOING_HOUSEWORK: INDEPENDENT
BATHING: INDEPENDENT
MANAGING_FINANCES: INDEPENDENT
DRESSING: INDEPENDENT

## 2024-04-26 NOTE — TELEPHONE ENCOUNTER
----- Message from Wendy Petty DO sent at 4/26/2024  8:58 AM EDT -----  Please let pt know that his PSA, uric acid, blood counts, electrolytes, and kidney function are all normal.   His lipids were quite elevated.  notify him of all the lipid #s. His  sugar was elevated at 127 but hs HbA1c, the 3 mo avg of sugars was good at 5.6. (ideally for good diabetic control, we would like to keep this less than 7.    His screening thyroid test was slightly out of range but the actual level of circulating thyroid hormone was normal.    His vitamin D level is low at 28(should be above 30).  I recommend they  take at least 2000 units of an OTC vitamin D supplement daily   His liver enzymes were again elevated.  I recommend he avoid any tylenol or alcohol and check an US of his liver  Enter order for biliary US dx elevated LFTs  Have him fu in 3 mo to recheck labs and see how he is doing.

## 2024-04-26 NOTE — TELEPHONE ENCOUNTER
Informed patient.  He would like to hold off on the ultrasound and figure out if he wants to do here or in [place].  He will be back in town in Sept and will call back to make a follow up when he is in town.

## 2024-04-30 DIAGNOSIS — M10.9 GOUT, UNSPECIFIED: ICD-10-CM

## 2024-04-30 RX ORDER — ALLOPURINOL 300 MG/1
TABLET ORAL
Qty: 90 TABLET | Refills: 1 | Status: SHIPPED | OUTPATIENT
Start: 2024-04-30

## 2024-05-22 ENCOUNTER — TELEPHONE (OUTPATIENT)
Dept: PRIMARY CARE | Facility: CLINIC | Age: 74
End: 2024-05-22
Payer: MEDICARE

## 2024-05-22 NOTE — TELEPHONE ENCOUNTER
Pt is in [place] and wanted a referral of Ultrasound of liver to be sent to [place]. Can be faxed to [phone]

## 2024-05-23 DIAGNOSIS — R79.89 ELEVATED LFTS: ICD-10-CM

## 2024-06-03 ENCOUNTER — HOSPITAL ENCOUNTER (OUTPATIENT)
Dept: RADIOLOGY | Facility: HOSPITAL | Age: 74
Discharge: HOME | End: 2024-06-03
Payer: MEDICARE

## 2024-06-03 DIAGNOSIS — R79.89 ELEVATED LFTS: ICD-10-CM

## 2024-06-03 PROCEDURE — 76705 ECHO EXAM OF ABDOMEN: CPT | Performed by: RADIOLOGY

## 2024-06-03 PROCEDURE — 76705 ECHO EXAM OF ABDOMEN: CPT

## 2024-06-11 ENCOUNTER — TELEPHONE (OUTPATIENT)
Dept: PRIMARY CARE | Facility: CLINIC | Age: 74
End: 2024-06-11
Payer: MEDICARE

## 2024-06-11 NOTE — TELEPHONE ENCOUNTER
----- Message from Wendy Petty DO sent at 6/4/2024  4:02 PM EDT -----  Please let patient know his ultrasound showed that his liver was enlarged and there was diffuse fatty infiltration.  I recommend he follow-up with his GI Dr Vollweiler or a liver specialist for further evaluation.  Enter referral for GI Dx  hepatomegaly, fatty liver, elevated LFTs

## 2024-06-24 DIAGNOSIS — I10 ESSENTIAL (PRIMARY) HYPERTENSION: ICD-10-CM

## 2024-06-24 RX ORDER — LISINOPRIL 20 MG/1
TABLET ORAL
Qty: 90 TABLET | Refills: 1 | Status: SHIPPED | OUTPATIENT
Start: 2024-06-24

## 2024-08-05 DIAGNOSIS — E11.9 TYPE 2 DIABETES MELLITUS WITHOUT COMPLICATIONS (MULTI): ICD-10-CM

## 2024-08-05 RX ORDER — BLOOD SUGAR DIAGNOSTIC
STRIP MISCELLANEOUS
Qty: 100 STRIP | Refills: 1 | Status: SHIPPED | OUTPATIENT
Start: 2024-08-05

## 2024-08-22 DIAGNOSIS — I10 ESSENTIAL (PRIMARY) HYPERTENSION: ICD-10-CM

## 2024-08-22 DIAGNOSIS — E11.9 TYPE 2 DIABETES MELLITUS WITHOUT COMPLICATION, WITHOUT LONG-TERM CURRENT USE OF INSULIN (MULTI): ICD-10-CM

## 2024-08-22 RX ORDER — METFORMIN HYDROCHLORIDE 500 MG/1
500 TABLET ORAL
Qty: 180 TABLET | Refills: 1 | Status: SHIPPED | OUTPATIENT
Start: 2024-08-22

## 2024-08-22 RX ORDER — BISOPROLOL FUMARATE AND HYDROCHLOROTHIAZIDE 10; 6.25 MG/1; MG/1
TABLET ORAL
Qty: 90 TABLET | Refills: 1 | Status: SHIPPED | OUTPATIENT
Start: 2024-08-22

## 2024-10-25 DIAGNOSIS — M10.9 GOUT, UNSPECIFIED: ICD-10-CM

## 2024-10-25 RX ORDER — ALLOPURINOL 300 MG/1
TABLET ORAL
Qty: 90 TABLET | Refills: 1 | Status: SHIPPED | OUTPATIENT
Start: 2024-10-25

## 2024-12-17 DIAGNOSIS — I10 ESSENTIAL (PRIMARY) HYPERTENSION: ICD-10-CM

## 2024-12-17 RX ORDER — LISINOPRIL 20 MG/1
TABLET ORAL
Qty: 90 TABLET | Refills: 1 | Status: SHIPPED | OUTPATIENT
Start: 2024-12-17

## 2025-01-20 ENCOUNTER — APPOINTMENT (OUTPATIENT)
Dept: PRIMARY CARE | Facility: CLINIC | Age: 75
End: 2025-01-20
Payer: MEDICARE

## 2025-01-20 VITALS
DIASTOLIC BLOOD PRESSURE: 72 MMHG | WEIGHT: 220.8 LBS | OXYGEN SATURATION: 97 % | TEMPERATURE: 97.4 F | HEART RATE: 70 BPM | RESPIRATION RATE: 18 BRPM | HEIGHT: 72 IN | SYSTOLIC BLOOD PRESSURE: 138 MMHG | BODY MASS INDEX: 29.91 KG/M2

## 2025-01-20 DIAGNOSIS — E78.5 HYPERLIPIDEMIA, UNSPECIFIED HYPERLIPIDEMIA TYPE: ICD-10-CM

## 2025-01-20 DIAGNOSIS — I10 HYPERTENSION, UNSPECIFIED TYPE: ICD-10-CM

## 2025-01-20 DIAGNOSIS — E55.9 VITAMIN D DEFICIENCY: ICD-10-CM

## 2025-01-20 DIAGNOSIS — M10.9 GOUT, UNSPECIFIED CAUSE, UNSPECIFIED CHRONICITY, UNSPECIFIED SITE: ICD-10-CM

## 2025-01-20 DIAGNOSIS — R79.89 ELEVATED LFTS: ICD-10-CM

## 2025-01-20 DIAGNOSIS — R53.83 OTHER FATIGUE: ICD-10-CM

## 2025-01-20 DIAGNOSIS — E11.9 TYPE 2 DIABETES MELLITUS WITHOUT COMPLICATION, WITHOUT LONG-TERM CURRENT USE OF INSULIN (MULTI): ICD-10-CM

## 2025-01-20 DIAGNOSIS — R35.0 URINE FREQUENCY: ICD-10-CM

## 2025-01-20 DIAGNOSIS — R79.89 ELEVATED TSH: ICD-10-CM

## 2025-01-20 DIAGNOSIS — E11.9 TYPE 2 DIABETES MELLITUS WITHOUT COMPLICATION, WITHOUT LONG-TERM CURRENT USE OF INSULIN (MULTI): Primary | ICD-10-CM

## 2025-01-20 DIAGNOSIS — I10 HYPERTENSION, UNSPECIFIED TYPE: Primary | ICD-10-CM

## 2025-01-20 LAB
HEMOGLOBIN A1C/HEMOGLOBIN TOTAL IN BLOOD EXTERNAL: 5.8 %
NON-UH HIE A/G RATIO: 1.5
NON-UH HIE ALB: 4.9 G/DL (ref 3.4–5)
NON-UH HIE ALK PHOS: 52 UNIT/L (ref 45–117)
NON-UH HIE BILIRUBIN, TOTAL: 1.2 MG/DL (ref 0.3–1.2)
NON-UH HIE BUN/CREAT RATIO: 19.2
NON-UH HIE BUN: 25 MG/DL (ref 9–23)
NON-UH HIE CALCIUM: 10.4 MG/DL (ref 8.7–10.4)
NON-UH HIE CALCULATED LDL CHOLESTEROL: 120 MG/DL (ref 60–130)
NON-UH HIE CALCULATED OSMOLALITY: 280 MOSM/KG (ref 275–295)
NON-UH HIE CHLORIDE: 101 MMOL/L (ref 98–107)
NON-UH HIE CHOLESTEROL: 196 MG/DL (ref 100–200)
NON-UH HIE CO2, VENOUS: 28 MMOL/L (ref 20–31)
NON-UH HIE CREATININE: 1.3 MG/DL (ref 0.6–1.1)
NON-UH HIE FREE T4: 1.3 NG/DL (ref 0.89–1.76)
NON-UH HIE GFR AA: >60
NON-UH HIE GLOBULIN: 3.2 G/DL
NON-UH HIE GLOMERULAR FILTRATION RATE: 54 ML/MIN/1.73M?
NON-UH HIE GLUCOSE: 129 MG/DL (ref 74–106)
NON-UH HIE GOT: 47 UNIT/L (ref 15–37)
NON-UH HIE GPT: 67 UNIT/L (ref 10–49)
NON-UH HIE HCT: 47.2 % (ref 41–52)
NON-UH HIE HDL CHOLESTEROL: 36 MG/DL (ref 40–60)
NON-UH HIE HGB A1C: 5.8 %
NON-UH HIE HGB: 16.3 G/DL (ref 13.5–17.5)
NON-UH HIE INSTR WBC ND: 8.1
NON-UH HIE K: 4.3 MMOL/L (ref 3.5–5.1)
NON-UH HIE MCH: 34.7 PG (ref 27–34)
NON-UH HIE MCHC: 34.5 G/DL (ref 32–37)
NON-UH HIE MCV: 100.4 FL (ref 80–100)
NON-UH HIE MPV: 8.7 FL (ref 7.4–10.4)
NON-UH HIE NA: 137 MMOL/L (ref 135–145)
NON-UH HIE PLATELET: 191 X10 (ref 150–450)
NON-UH HIE RBC: 4.7 X10 (ref 4.7–6.1)
NON-UH HIE RDW: 12.9 % (ref 11.5–14.5)
NON-UH HIE TOTAL CHOL/HDL CHOL RATIO: 5.4
NON-UH HIE TOTAL PROTEIN: 8.1 G/DL (ref 5.7–8.2)
NON-UH HIE TRIGLYCERIDES: 202 MG/DL (ref 30–150)
NON-UH HIE TSH: 4.92 UIU/ML (ref 0.55–4.78)
NON-UH HIE URIC ACID: 6.5 MG/DL (ref 3.7–9.2)
NON-UH HIE WBC: 8.1 X10 (ref 4.5–11)
POC APPEARANCE, URINE: CLEAR
POC BILIRUBIN, URINE: NEGATIVE
POC BLOOD, URINE: NEGATIVE
POC COLOR, URINE: YELLOW
POC GLUCOSE, URINE: NEGATIVE MG/DL
POC KETONES, URINE: NEGATIVE MG/DL
POC LEUKOCYTES, URINE: ABNORMAL
POC NITRITE,URINE: NEGATIVE
POC PH, URINE: 5.5 PH
POC PROTEIN, URINE: NEGATIVE MG/DL
POC SPECIFIC GRAVITY, URINE: 1.02
POC UROBILINOGEN, URINE: 0.2 EU/DL

## 2025-01-20 PROCEDURE — 99214 OFFICE O/P EST MOD 30 MIN: CPT | Performed by: FAMILY MEDICINE

## 2025-01-20 PROCEDURE — 3078F DIAST BP <80 MM HG: CPT | Performed by: FAMILY MEDICINE

## 2025-01-20 PROCEDURE — 1159F MED LIST DOCD IN RCRD: CPT | Performed by: FAMILY MEDICINE

## 2025-01-20 PROCEDURE — 87086 URINE CULTURE/COLONY COUNT: CPT

## 2025-01-20 PROCEDURE — 3075F SYST BP GE 130 - 139MM HG: CPT | Performed by: FAMILY MEDICINE

## 2025-01-20 PROCEDURE — 1036F TOBACCO NON-USER: CPT | Performed by: FAMILY MEDICINE

## 2025-01-20 PROCEDURE — 4010F ACE/ARB THERAPY RXD/TAKEN: CPT | Performed by: FAMILY MEDICINE

## 2025-01-20 PROCEDURE — 1160F RVW MEDS BY RX/DR IN RCRD: CPT | Performed by: FAMILY MEDICINE

## 2025-01-20 PROCEDURE — 3008F BODY MASS INDEX DOCD: CPT | Performed by: FAMILY MEDICINE

## 2025-01-20 PROCEDURE — 81003 URINALYSIS AUTO W/O SCOPE: CPT | Performed by: FAMILY MEDICINE

## 2025-01-21 LAB — BACTERIA UR CULT: NORMAL

## 2025-01-22 ENCOUNTER — TELEPHONE (OUTPATIENT)
Dept: PRIMARY CARE | Facility: CLINIC | Age: 75
End: 2025-01-22
Payer: MEDICARE

## 2025-01-22 NOTE — TELEPHONE ENCOUNTER
----- Message from Wendy Petty sent at 1/21/2025 12:53 PM EST -----  Please let pt know that his uric acid level, blood counts, and electrolytes were normal.   His triglycerides were slightly elevated and his HDL or good cholesterol was low.  Notify them of all the lipid #s.    His sugar was elevated at 129 but his HbA1c, the 3 mo avg of his sugars was still good at 5.8.  Ideally for good diabetic control, we would like to keep this less than 6.5.  I recommend a healthy diet and exercise and we will continue to monitor.    His screening thyroid test was again slightly out of range but the actual level of circulating thyroid hormone was normal.  His kidney function was slightly elevated at 1.3(should be less than 1.1) and his liver enzymes were again slightly elevated.  We will continue to monitor.

## 2025-01-22 NOTE — TELEPHONE ENCOUNTER
----- Message from Wendy Petty sent at 1/22/2025 12:06 PM EST -----  Please let pt know their urine culture was negative for an infection.

## 2025-02-14 DIAGNOSIS — E11.9 TYPE 2 DIABETES MELLITUS WITHOUT COMPLICATION, WITHOUT LONG-TERM CURRENT USE OF INSULIN (MULTI): ICD-10-CM

## 2025-02-14 DIAGNOSIS — I10 ESSENTIAL (PRIMARY) HYPERTENSION: ICD-10-CM

## 2025-02-14 RX ORDER — BISOPROLOL FUMARATE AND HYDROCHLOROTHIAZIDE 10; 6.25 MG/1; MG/1
TABLET ORAL
Qty: 90 TABLET | Refills: 1 | Status: SHIPPED | OUTPATIENT
Start: 2025-02-14

## 2025-02-14 RX ORDER — METFORMIN HYDROCHLORIDE 500 MG/1
500 TABLET ORAL
Qty: 180 TABLET | Refills: 1 | Status: SHIPPED | OUTPATIENT
Start: 2025-02-14

## 2025-04-03 ENCOUNTER — APPOINTMENT (OUTPATIENT)
Dept: PRIMARY CARE | Facility: CLINIC | Age: 75
End: 2025-04-03
Payer: MEDICARE

## 2025-04-23 DIAGNOSIS — M10.9 GOUT, UNSPECIFIED: ICD-10-CM

## 2025-04-23 RX ORDER — ALLOPURINOL 300 MG/1
300 TABLET ORAL DAILY
Qty: 90 TABLET | Refills: 1 | Status: SHIPPED | OUTPATIENT
Start: 2025-04-23

## 2025-05-04 NOTE — PROGRESS NOTES
Subjective   Reason for Visit: Alberto Tinsley is an 74 y.o. male here for a Medicare Wellness visit.     Past Medical, Surgical, and Family History reviewed and updated in chart.    Reviewed all medications by prescribing practitioner or clinical pharmacist (such as prescriptions, OTCs, herbal therapies and supplements) and documented in the medical record.    HPI  74 years old male presents for MWV    hx HTN-on lisinopril 20mg and bisoprolol/HCTZ 10/6.25;  has not been checking BP at home     hx HLD/DM2/obesity BMI 31 wt is up 10lbs since jan- recent trip to [place]  last hba1c in jan was 5.8    Hx  of tob=quit around 2000  no abd pain  prior CT showed aortic calcification  pt defers checking screening US for AAA     hx of elevated tsh but normal free T4 in past.   4/2018 thyroid US showed:  Slight enlargement of both lobes.  Inhomogeneous thyroid parenchyma is a nonspecific ultrasonographic  finding but consistent with chronic medical thyroid disease.  no neck changes.    hx gout- no recent flare   takes allopurinol    hx of kidney stones in past;   had renal US in may 2018- showed an incidental mass in prostate- referred to urology- saw dr grubbs- was told nothing to worry about per pt;  saw him 5/2020 virtually- he was supposed to check a 24 hr urine and f/u in 6 mo but never did  Had gross hematuria/kidney stone 2022- saw urologist;  never fu; no recent issues  Was admitted for UTI in [place] in oct 2024-  Currently no symptoms   Denies any dysuria, gross hematuria, fevers or chills or flank pain.  4/2024 PSA wnl    hx of elevated LFTs    had liver US 6/2024- enlarged liver; fatty liver- saw GI and had additional labs and fibrocan and was told ok per GI; +etoh    had +cologuard 2019- had f/u colonoscopy 6/19 and 12/19+polyps; f/u 2 yrs; no bowel changes. no blood in stool.    GI volweiler- had done 4/26/23  fu 3 yrs;      shingrix-defers  flu shot-had  pneumovax-2015  prevnar   covid shots- had  RSV-not yet-  defers    denies any chest pains, palpitations, shortness of breath, abdominal pains, reflux, nausea, vomiting, diarrhea, constipation, blood in stool, melena.     wears glasses;  sees optho.   sees dentist prn    lives in [facility] with his girlfriend most of the time  comes back to stay with daughter here    /88   Pulse 73   Temp 37.1 °C (98.8 °F)   Ht 1.829 m (6')   Wt 104 kg (230 lb 3.2 oz)   BMI 31.22 kg/m²     Patient Self Assessment of Health Status  Patient Self Assessment: Good    Nutrition and Exercise  Current Diet: Well Balanced Diet  Adequate Fluid Intake: Yes  Caffeine: Yes  Exercise Frequency: Infrequently    Functional Ability/Level of Safety  Cognitive Impairment Observed: No cognitive impairment observed    Home Safety Risk Factors: None    Patient Care Team:  Wendy Petty DO as PCP - General  Wendy Petty DO as PCP - [employer]vantage PCP  Jason F Vollweiler, MD as Referring Physician (Gastroenterology)     Review of Systems  ROS as stated in HPI  Medicare Wellness Billing Compliance Satisfied    *This is a visual tool to show completion of required items on the day of the visit. Green checks will only appear on the date of visit.    Objective     Vitals:  /88   Pulse 73   Temp 37.1 °C (98.8 °F)   Ht 1.829 m (6')   Wt 104 kg (230 lb 3.2 oz)   BMI 31.22 kg/m²       Physical Exam  Constitutional: A&O; NAD  HEENT: conjunctiva normal. EOMI; PERRLA; lids normal; TM's clear;   Neck: supple; No lymphadenopathy   Heart: RRR     Lungs: CTA bilateral   Abd: Soft, Nontender, Nondistended  Ext:  No edema;    Neuro: No gross neuro deficits    Psych: Judgment, orientation, mood, affect, insight wnl     Assessment/Plan   Problem List Items Addressed This Visit       Diabetes mellitus, type 2 (Multi)    Relevant Orders    Albumin-Creatinine Ratio, Urine Random    Comprehensive Metabolic Panel    Hemoglobin A1C    Elevated LFTs    Relevant Orders    Comprehensive Metabolic  Panel    Elevated TSH    Relevant Orders    TSH with reflex to Free T4 if abnormal    Fatigue    Relevant Orders    CBC    TSH with reflex to Free T4 if abnormal    Gout    HLD (hyperlipidemia)    Relevant Orders    Comprehensive Metabolic Panel    Lipid Panel    Hypertension    Relevant Orders    Comprehensive Metabolic Panel    Vitamin D deficiency    Relevant Orders    Vitamin D 25-Hydroxy,Total (for eval of Vitamin D levels)     Other Visit Diagnoses         Medicare annual wellness visit, subsequent    -  Primary      Prostate cancer screening        Relevant Orders    Prostate Spec.Ag,Screen        Reviewed GI note from 8/2024- get fibroscan report  Check labs /psa  flu shot-had  prevnar 13 2016  Pneumovax 2015  Prevnar 20 2024  recommended RSV and shingrix - defers at this time.   had covid shots  4/2024 PSA  4/2024 urine MA neg; on lisinopril; unable to give urine sample today  cont to monitor BP  4/2023  colonosocpy +polyps f/u 3 yrs per pt GI vollweiler  healthy lifestyle-diet and exercise, wt loss.  annual optho   Fu 6 mo or sooner if needed

## 2025-05-05 ENCOUNTER — APPOINTMENT (OUTPATIENT)
Dept: PRIMARY CARE | Facility: CLINIC | Age: 75
End: 2025-05-05
Payer: MEDICARE

## 2025-05-05 VITALS
TEMPERATURE: 98.8 F | SYSTOLIC BLOOD PRESSURE: 161 MMHG | DIASTOLIC BLOOD PRESSURE: 88 MMHG | BODY MASS INDEX: 31.18 KG/M2 | HEART RATE: 73 BPM | HEIGHT: 72 IN | WEIGHT: 230.2 LBS

## 2025-05-05 DIAGNOSIS — E78.5 HYPERLIPIDEMIA, UNSPECIFIED HYPERLIPIDEMIA TYPE: ICD-10-CM

## 2025-05-05 DIAGNOSIS — E55.9 VITAMIN D DEFICIENCY: ICD-10-CM

## 2025-05-05 DIAGNOSIS — R79.89 ELEVATED LFTS: ICD-10-CM

## 2025-05-05 DIAGNOSIS — Z12.5 PROSTATE CANCER SCREENING: ICD-10-CM

## 2025-05-05 DIAGNOSIS — M10.9 GOUT, UNSPECIFIED CAUSE, UNSPECIFIED CHRONICITY, UNSPECIFIED SITE: ICD-10-CM

## 2025-05-05 DIAGNOSIS — I10 HYPERTENSION, UNSPECIFIED TYPE: ICD-10-CM

## 2025-05-05 DIAGNOSIS — E11.9 TYPE 2 DIABETES MELLITUS WITHOUT COMPLICATION, WITHOUT LONG-TERM CURRENT USE OF INSULIN: ICD-10-CM

## 2025-05-05 DIAGNOSIS — R79.89 ELEVATED TSH: ICD-10-CM

## 2025-05-05 DIAGNOSIS — Z00.00 MEDICARE ANNUAL WELLNESS VISIT, SUBSEQUENT: Primary | ICD-10-CM

## 2025-05-05 DIAGNOSIS — R53.83 OTHER FATIGUE: ICD-10-CM

## 2025-05-05 PROCEDURE — 1160F RVW MEDS BY RX/DR IN RCRD: CPT | Performed by: FAMILY MEDICINE

## 2025-05-05 PROCEDURE — 3008F BODY MASS INDEX DOCD: CPT | Performed by: FAMILY MEDICINE

## 2025-05-05 PROCEDURE — 3077F SYST BP >= 140 MM HG: CPT | Performed by: FAMILY MEDICINE

## 2025-05-05 PROCEDURE — 3044F HG A1C LEVEL LT 7.0%: CPT | Performed by: FAMILY MEDICINE

## 2025-05-05 PROCEDURE — 1159F MED LIST DOCD IN RCRD: CPT | Performed by: FAMILY MEDICINE

## 2025-05-05 PROCEDURE — 4010F ACE/ARB THERAPY RXD/TAKEN: CPT | Performed by: FAMILY MEDICINE

## 2025-05-05 PROCEDURE — 1036F TOBACCO NON-USER: CPT | Performed by: FAMILY MEDICINE

## 2025-05-05 PROCEDURE — 1170F FXNL STATUS ASSESSED: CPT | Performed by: FAMILY MEDICINE

## 2025-05-05 PROCEDURE — 1124F ACP DISCUSS-NO DSCNMKR DOCD: CPT | Performed by: FAMILY MEDICINE

## 2025-05-05 PROCEDURE — G0439 PPPS, SUBSEQ VISIT: HCPCS | Performed by: FAMILY MEDICINE

## 2025-05-05 PROCEDURE — 3079F DIAST BP 80-89 MM HG: CPT | Performed by: FAMILY MEDICINE

## 2025-05-05 ASSESSMENT — ACTIVITIES OF DAILY LIVING (ADL)
TAKING_MEDICATION: INDEPENDENT
BATHING: INDEPENDENT
GROCERY_SHOPPING: INDEPENDENT
DRESSING: INDEPENDENT
MANAGING_FINANCES: INDEPENDENT
DOING_HOUSEWORK: INDEPENDENT

## 2025-05-05 ASSESSMENT — PATIENT HEALTH QUESTIONNAIRE - PHQ9
2. FEELING DOWN, DEPRESSED OR HOPELESS: NOT AT ALL
1. LITTLE INTEREST OR PLEASURE IN DOING THINGS: NOT AT ALL
SUM OF ALL RESPONSES TO PHQ9 QUESTIONS 1 AND 2: 0

## 2025-05-09 LAB
NON-UH HIE A/G RATIO: 1.6
NON-UH HIE ALB: 4.7 G/DL (ref 3.4–5)
NON-UH HIE ALK PHOS: 48 UNIT/L (ref 45–117)
NON-UH HIE BILIRUBIN, TOTAL: 0.6 MG/DL (ref 0.3–1.2)
NON-UH HIE BUN/CREAT RATIO: 14.6
NON-UH HIE BUN: 19 MG/DL (ref 9–23)
NON-UH HIE CALCIUM: 10.1 MG/DL (ref 8.7–10.4)
NON-UH HIE CALCULATED LDL CHOLESTEROL: 119 MG/DL (ref 60–130)
NON-UH HIE CALCULATED OSMOLALITY: 287 MOSM/KG (ref 275–295)
NON-UH HIE CHLORIDE: 103 MMOL/L (ref 98–107)
NON-UH HIE CHOLESTEROL: 216 MG/DL (ref 100–200)
NON-UH HIE CO2, VENOUS: 27 MMOL/L (ref 20–31)
NON-UH HIE CREATININE: 1.3 MG/DL (ref 0.6–1.1)
NON-UH HIE FREE T4: 1.08 NG/DL (ref 0.89–1.76)
NON-UH HIE GFR AA: >60
NON-UH HIE GLOBULIN: 3 G/DL
NON-UH HIE GLOMERULAR FILTRATION RATE: 54 ML/MIN/1.73M?
NON-UH HIE GLUCOSE: 160 MG/DL (ref 74–106)
NON-UH HIE GOT: 54 UNIT/L (ref 15–37)
NON-UH HIE GPT: 78 UNIT/L (ref 10–49)
NON-UH HIE HCT: 47.1 % (ref 41–52)
NON-UH HIE HDL CHOLESTEROL: 31 MG/DL (ref 40–60)
NON-UH HIE HGB A1C: 5.9 %
NON-UH HIE HGB: 16.5 G/DL (ref 13.5–17.5)
NON-UH HIE INSTR WBC ND: 6.1
NON-UH HIE K: 4.3 MMOL/L (ref 3.5–5.1)
NON-UH HIE MCH: 35.8 PG (ref 27–34)
NON-UH HIE MCHC: 35.1 G/DL (ref 32–37)
NON-UH HIE MCV: 101.9 FL (ref 80–100)
NON-UH HIE MPV: 8.7 FL (ref 7.4–10.4)
NON-UH HIE NA: 141 MMOL/L (ref 135–145)
NON-UH HIE PLATELET: 174 X10 (ref 150–450)
NON-UH HIE PROSTATIC SPECIFIC AG SCREEN: 0.4 NG/ML (ref 0–4)
NON-UH HIE RBC: 4.62 X10 (ref 4.7–6.1)
NON-UH HIE RDW: 13 % (ref 11.5–14.5)
NON-UH HIE TOTAL CHOL/HDL CHOL RATIO: 7
NON-UH HIE TOTAL PROTEIN: 7.7 G/DL (ref 5.7–8.2)
NON-UH HIE TRIGLYCERIDES: 328 MG/DL (ref 30–150)
NON-UH HIE TSH: 9.66 UIU/ML (ref 0.55–4.78)
NON-UH HIE VIT D 25: 28 NG/ML
NON-UH HIE WBC: 6.1 X10 (ref 4.5–11)

## 2025-05-12 ENCOUNTER — TELEPHONE (OUTPATIENT)
Dept: PRIMARY CARE | Facility: CLINIC | Age: 75
End: 2025-05-12
Payer: MEDICARE

## 2025-05-12 NOTE — TELEPHONE ENCOUNTER
----- Message from Wendy Petty sent at 5/9/2025  2:34 PM EDT -----  Please let pt know that his prostate screening test, blood counts, and electrolytes are normal.   His creatinine or kidney function and liver enzymes are again slightly elevated.  His TSH or screening thyroid test was again slightly out of range but the actual level of circulating thyroid hormone was normal.    His cholesterol was quite elevated.  notify them of all the lipid #s.  See if he is open to trying a cholesterol medication.  His sugar was elevated at 160 and his HbA1c, the 3 mo avg of sugars was 5.9, which indicates good diabetic control.  I recommend a healthy diet and exercise and follow up in a few months to recheck.   ----- Message -----  From: Martir Israel - Lab Results In  Sent: 5/9/2025  11:49 AM EDT  To: Wendy Petty, DO

## 2025-05-23 DIAGNOSIS — E11.9 TYPE 2 DIABETES MELLITUS WITHOUT COMPLICATIONS: ICD-10-CM

## 2025-05-23 RX ORDER — BLOOD SUGAR DIAGNOSTIC
STRIP MISCELLANEOUS
Qty: 100 STRIP | Refills: 1 | Status: SHIPPED | OUTPATIENT
Start: 2025-05-23

## 2025-06-11 DIAGNOSIS — I10 ESSENTIAL (PRIMARY) HYPERTENSION: ICD-10-CM

## 2025-06-11 RX ORDER — LISINOPRIL 20 MG/1
20 TABLET ORAL
Qty: 90 TABLET | Refills: 1 | Status: SHIPPED | OUTPATIENT
Start: 2025-06-11

## 2025-08-10 DIAGNOSIS — I10 ESSENTIAL (PRIMARY) HYPERTENSION: ICD-10-CM

## 2025-08-10 DIAGNOSIS — E11.9 TYPE 2 DIABETES MELLITUS WITHOUT COMPLICATION, WITHOUT LONG-TERM CURRENT USE OF INSULIN: ICD-10-CM

## 2025-08-11 RX ORDER — BISOPROLOL FUMARATE AND HYDROCHLOROTHIAZIDE 10; 6.25 MG/1; MG/1
1 TABLET ORAL
Qty: 90 TABLET | Refills: 1 | Status: SHIPPED | OUTPATIENT
Start: 2025-08-11

## 2025-08-11 RX ORDER — METFORMIN HYDROCHLORIDE 500 MG/1
500 TABLET ORAL
Qty: 180 TABLET | Refills: 1 | Status: SHIPPED | OUTPATIENT
Start: 2025-08-11

## 2025-11-11 ENCOUNTER — APPOINTMENT (OUTPATIENT)
Dept: PRIMARY CARE | Facility: CLINIC | Age: 75
End: 2025-11-11
Payer: MEDICARE